# Patient Record
Sex: FEMALE | Race: WHITE | NOT HISPANIC OR LATINO | Employment: FULL TIME | URBAN - METROPOLITAN AREA
[De-identification: names, ages, dates, MRNs, and addresses within clinical notes are randomized per-mention and may not be internally consistent; named-entity substitution may affect disease eponyms.]

---

## 2019-01-31 ENCOUNTER — TRANSCRIBE ORDERS (OUTPATIENT)
Dept: PULMONOLOGY | Facility: HOSPITAL | Age: 39
End: 2019-01-31

## 2019-01-31 DIAGNOSIS — G47.33 OBSTRUCTIVE SLEEP APNEA (ADULT) (PEDIATRIC): Primary | ICD-10-CM

## 2019-02-27 ENCOUNTER — HOSPITAL ENCOUNTER (OUTPATIENT)
Dept: SLEEP MEDICINE | Facility: HOSPITAL | Age: 39
Discharge: HOME | End: 2019-02-27
Admitting: INTERNAL MEDICINE

## 2019-02-27 DIAGNOSIS — G47.33 OBSTRUCTIVE SLEEP APNEA (ADULT) (PEDIATRIC): ICD-10-CM

## 2019-02-27 PROCEDURE — 95800 SLP STDY UNATTENDED: CPT | Performed by: INTERNAL MEDICINE

## 2019-02-27 PROCEDURE — 95800 SLP STDY UNATTENDED: CPT

## 2019-02-27 RX ORDER — PROPRANOLOL HCL 60 MG
60 CAPSULE, EXTENDED RELEASE 24HR ORAL DAILY
COMMUNITY

## 2019-02-27 RX ORDER — HYDROXYCHLOROQUINE SULFATE 200 MG/1
TABLET, FILM COATED ORAL 2 TIMES DAILY
COMMUNITY

## 2019-02-27 RX ORDER — LISINOPRIL 20 MG/1
5 TABLET ORAL DAILY
COMMUNITY

## 2019-02-27 RX ORDER — MYCOPHENOLATE MOFETIL 500 MG/1
TABLET ORAL 2 TIMES DAILY
COMMUNITY

## 2019-02-27 RX ORDER — SUMATRIPTAN 100 MG/1
100 TABLET, FILM COATED ORAL
COMMUNITY

## 2019-02-27 RX ORDER — OMEPRAZOLE 40 MG/1
40 CAPSULE, DELAYED RELEASE ORAL DAILY
COMMUNITY

## 2019-02-27 RX ORDER — PROMETHAZINE HYDROCHLORIDE 12.5 MG/1
12.5 TABLET ORAL EVERY 6 HOURS PRN
COMMUNITY

## 2019-02-27 RX ORDER — DULOXETIN HYDROCHLORIDE 60 MG/1
60 CAPSULE, DELAYED RELEASE ORAL DAILY
COMMUNITY

## 2019-02-27 RX ORDER — PREDNISONE 20 MG/1
5 TABLET ORAL DAILY
COMMUNITY

## 2019-02-28 VITALS
BODY MASS INDEX: 39.99 KG/M2 | HEIGHT: 67 IN | OXYGEN SATURATION: 96 % | SYSTOLIC BLOOD PRESSURE: 159 MMHG | HEART RATE: 106 BPM | DIASTOLIC BLOOD PRESSURE: 88 MMHG | WEIGHT: 254.8 LBS

## 2019-03-11 ENCOUNTER — CONSULT (OUTPATIENT)
Dept: SLEEP MEDICINE | Facility: HOSPITAL | Age: 39
End: 2019-03-11

## 2019-03-11 VITALS
WEIGHT: 255.6 LBS | SYSTOLIC BLOOD PRESSURE: 146 MMHG | BODY MASS INDEX: 40.12 KG/M2 | DIASTOLIC BLOOD PRESSURE: 80 MMHG | HEIGHT: 67 IN | HEART RATE: 85 BPM | OXYGEN SATURATION: 98 %

## 2019-03-11 DIAGNOSIS — G47.61 PERIODIC LIMB MOVEMENT DISORDER: ICD-10-CM

## 2019-03-11 DIAGNOSIS — G47.33 OBSTRUCTIVE SLEEP APNEA, ADULT: Primary | ICD-10-CM

## 2019-03-11 PROCEDURE — 99243 OFF/OP CNSLTJ NEW/EST LOW 30: CPT | Performed by: NURSE PRACTITIONER

## 2019-03-11 NOTE — PATIENT INSTRUCTIONS
Restless Legs Syndrome  Restless legs syndrome is a condition that causes uncomfortable feelings or sensations in the legs, especially while sitting or lying down. The sensations usually cause an overwhelming urge to move the legs. The arms can also sometimes be affected.  The condition can range from mild to severe. The symptoms often interfere with a person's ability to sleep.  What are the causes?  The cause of this condition is not known.  What increases the risk?  This condition is more likely to develop in:  · People who are older than age 50.  · Pregnant women. In general, restless legs syndrome is more common in women than in men.  · People who have a family history of the condition.  · People who have certain medical conditions, such as iron deficiency, kidney disease, Parkinson disease, or nerve damage.  · People who take certain medicines, such as medicines for high blood pressure, nausea, colds, allergies, depression, and some heart conditions.    What are the signs or symptoms?  The main symptom of this condition is uncomfortable sensations in the legs. These sensations may be:  · Described as pulling, tingling, prickling, throbbing, crawling, or burning.  · Worse while you are sitting or lying down.  · Worse during periods of rest or inactivity.  · Worse at night, often interfering with your sleep.  · Accompanied by a very strong urge to move your legs.  · Temporarily relieved by movement of your legs.    The sensations usually affect both sides of the body. The arms can also be affected, but this is rare. People who have this condition often have tiredness during the day because of their lack of sleep at night.  How is this diagnosed?  This condition may be diagnosed based on your description of the symptoms. You may also have tests, including blood tests, to check for other conditions that may lead to your symptoms. In some cases, you may be asked to spend some time in a sleep lab so your sleeping  can be monitored.  How is this treated?  Treatment for this condition is focused on managing the symptoms. Treatment may include:  · Self-help and lifestyle changes.  · Medicines.    Follow these instructions at home:  · Take medicines only as directed by your health care provider.  · Try these methods to get temporary relief from the uncomfortable sensations:  ? Massage your legs.  ? Walk or stretch.  ? Take a cold or hot bath.  · Practice good sleep habits. For example, go to bed and get up at the same time every day.  · Exercise regularly.  · Practice ways of relaxing, such as yoga or meditation.  · Avoid caffeine and alcohol.  · Do not use any tobacco products, including cigarettes, chewing tobacco, or electronic cigarettes. If you need help quitting, ask your health care provider.  · Keep all follow-up visits as directed by your health care provider. This is important.  Contact a health care provider if:  Your symptoms do not improve with treatment, or they get worse.  This information is not intended to replace advice given to you by your health care provider. Make sure you discuss any questions you have with your health care provider.  Document Released: 12/08/2003 Document Revised: 05/25/2017 Document Reviewed: 12/14/2015  [employer] Interactive Patient Education © 2018 [employer]  Sleep Apnea  Sleep apnea is a condition that affects breathing. People with sleep apnea have moments during sleep when their breathing pauses briefly or gets shallow. Sleep apnea can cause these symptoms:  · Trouble staying asleep.  · Sleepiness or tiredness during the day.  · Irritability.  · Loud snoring.  · Morning headaches.  · Trouble concentrating.  · Forgetting things.  · Less interest in sex.  · Being sleepy for no reason.  · Mood swings.  · Personality changes.  · Depression.  · Waking up a lot during the night to pee (urinate).  · Dry mouth.  · Sore throat.    Follow these instructions at home:  · Make any changes in  your routine that your doctor recommends.  · Eat a healthy, well-balanced diet.  · Take over-the-counter and prescription medicines only as told by your doctor.  · Avoid using alcohol, calming medicines (sedatives), and narcotic medicines.  · Take steps to lose weight if you are overweight.  · If you were given a machine (device) to use while you sleep, use it only as told by your doctor.  · Do not use any tobacco products, such as cigarettes, chewing tobacco, and e-cigarettes. If you need help quitting, ask your doctor.  · Keep all follow-up visits as told by your doctor. This is important.  Contact a doctor if:  · The machine that you were given to use during sleep is uncomfortable or does not seem to be working.  · Your symptoms do not get better.  · Your symptoms get worse.  Get help right away if:  · Your chest hurts.  · You have trouble breathing in enough air (shortness of breath).  · You have an uncomfortable feeling in your back, arms, or stomach.  · You have trouble talking.  · One side of your body feels weak.  · A part of your face is hanging down (drooping).  These symptoms may be an emergency. Do not wait to see if the symptoms will go away. Get medical help right away. Call your local emergency services (911 in the [place] Do not drive yourself to the hospital.  This information is not intended to replace advice given to you by your health care provider. Make sure you discuss any questions you have with your health care provider.  Document Released: 09/26/2009 Document Revised: 08/13/2017 Document Reviewed: 09/26/2016  [employer] Interactive Patient Education © 2018 [employer]

## 2021-09-20 ENCOUNTER — CLINICAL SUPPORT NO REQUIREMENTS (OUTPATIENT)
Dept: PULMONOLOGY | Facility: CLINIC | Age: 41
End: 2021-09-20

## 2021-09-20 DIAGNOSIS — Z01.812 BLOOD TESTS PRIOR TO TREATMENT OR PROCEDURE: Primary | ICD-10-CM

## 2021-09-20 PROCEDURE — 99211 OFF/OP EST MAY X REQ PHY/QHP: CPT | Performed by: INTERNAL MEDICINE

## 2021-09-20 PROCEDURE — U0004 COV-19 TEST NON-CDC HGH THRU: HCPCS | Performed by: INTERNAL MEDICINE

## 2021-09-21 LAB — SARS-COV-2 RNA NOSE QL NAA+PROBE: NOT DETECTED

## 2021-09-22 ENCOUNTER — OFFICE VISIT (OUTPATIENT)
Dept: PULMONOLOGY | Facility: CLINIC | Age: 41
End: 2021-09-22

## 2021-09-22 VITALS
WEIGHT: 293 LBS | DIASTOLIC BLOOD PRESSURE: 90 MMHG | SYSTOLIC BLOOD PRESSURE: 130 MMHG | OXYGEN SATURATION: 98 % | HEART RATE: 76 BPM | HEIGHT: 67 IN | TEMPERATURE: 97.9 F | BODY MASS INDEX: 45.99 KG/M2

## 2021-09-22 DIAGNOSIS — R06.02 SHORTNESS OF BREATH: Primary | ICD-10-CM

## 2021-09-22 DIAGNOSIS — R06.00 DYSPNEA, UNSPECIFIED TYPE: ICD-10-CM

## 2021-09-22 PROBLEM — M32.9 SLE (SYSTEMIC LUPUS ERYTHEMATOSUS RELATED SYNDROME) (HCC): Status: ACTIVE | Noted: 2021-09-22

## 2021-09-22 LAB
ALBUMIN SERPL-MCNC: 4.7 G/DL (ref 3.5–5.2)
ALBUMIN/GLOB SERPL: 1.7 G/DL
ALP SERPL-CCNC: 108 U/L (ref 39–117)
ALT SERPL W P-5'-P-CCNC: 25 U/L (ref 1–33)
ANION GAP SERPL CALCULATED.3IONS-SCNC: 11.6 MMOL/L (ref 5–15)
AST SERPL-CCNC: 13 U/L (ref 1–32)
BASOPHILS # BLD AUTO: 0.08 10*3/MM3 (ref 0–0.2)
BASOPHILS NFR BLD AUTO: 0.8 % (ref 0–1.5)
BILIRUB SERPL-MCNC: 0.3 MG/DL (ref 0–1.2)
BUN SERPL-MCNC: 5 MG/DL (ref 6–20)
BUN/CREAT SERPL: 8.2 (ref 7–25)
CALCIUM SPEC-SCNC: 9.2 MG/DL (ref 8.6–10.5)
CHLORIDE SERPL-SCNC: 105 MMOL/L (ref 98–107)
CO2 SERPL-SCNC: 23.4 MMOL/L (ref 22–29)
CREAT SERPL-MCNC: 0.61 MG/DL (ref 0.57–1)
DEPRECATED RDW RBC AUTO: 41.7 FL (ref 37–54)
EOSINOPHIL # BLD AUTO: 0.13 10*3/MM3 (ref 0–0.4)
EOSINOPHIL NFR BLD AUTO: 1.3 % (ref 0.3–6.2)
ERYTHROCYTE [DISTWIDTH] IN BLOOD BY AUTOMATED COUNT: 13.3 % (ref 12.3–15.4)
GFR SERPL CREATININE-BSD FRML MDRD: 108 ML/MIN/1.73
GLOBULIN UR ELPH-MCNC: 2.7 GM/DL
GLUCOSE SERPL-MCNC: 103 MG/DL (ref 65–99)
HCT VFR BLD AUTO: 39.5 % (ref 34–46.6)
HGB BLD-MCNC: 12.4 G/DL (ref 12–15.9)
IMM GRANULOCYTES # BLD AUTO: 0.04 10*3/MM3 (ref 0–0.05)
IMM GRANULOCYTES NFR BLD AUTO: 0.4 % (ref 0–0.5)
LYMPHOCYTES # BLD AUTO: 2.13 10*3/MM3 (ref 0.7–3.1)
LYMPHOCYTES NFR BLD AUTO: 20.8 % (ref 19.6–45.3)
MCH RBC QN AUTO: 27.1 PG (ref 26.6–33)
MCHC RBC AUTO-ENTMCNC: 31.4 G/DL (ref 31.5–35.7)
MCV RBC AUTO: 86.4 FL (ref 79–97)
MONOCYTES # BLD AUTO: 0.62 10*3/MM3 (ref 0.1–0.9)
MONOCYTES NFR BLD AUTO: 6.1 % (ref 5–12)
NEUTROPHILS NFR BLD AUTO: 7.22 10*3/MM3 (ref 1.7–7)
NEUTROPHILS NFR BLD AUTO: 70.6 % (ref 42.7–76)
NRBC BLD AUTO-RTO: 0 /100 WBC (ref 0–0.2)
NT-PROBNP SERPL-MCNC: 37.6 PG/ML (ref 0–450)
PLATELET # BLD AUTO: 387 10*3/MM3 (ref 140–450)
PMV BLD AUTO: 10.7 FL (ref 6–12)
POTASSIUM SERPL-SCNC: 4 MMOL/L (ref 3.5–5.2)
PROT SERPL-MCNC: 7.4 G/DL (ref 6–8.5)
RBC # BLD AUTO: 4.57 10*6/MM3 (ref 3.77–5.28)
SODIUM SERPL-SCNC: 140 MMOL/L (ref 136–145)
TROPONIN T SERPL-MCNC: <0.01 NG/ML (ref 0–0.03)
TSH SERPL DL<=0.05 MIU/L-ACNC: 1.01 UIU/ML (ref 0.27–4.2)
WBC # BLD AUTO: 10.22 10*3/MM3 (ref 3.4–10.8)

## 2021-09-22 PROCEDURE — 86003 ALLG SPEC IGE CRUDE XTRC EA: CPT | Performed by: INTERNAL MEDICINE

## 2021-09-22 PROCEDURE — 80053 COMPREHEN METABOLIC PANEL: CPT | Performed by: INTERNAL MEDICINE

## 2021-09-22 PROCEDURE — 84443 ASSAY THYROID STIM HORMONE: CPT | Performed by: INTERNAL MEDICINE

## 2021-09-22 PROCEDURE — 99204 OFFICE O/P NEW MOD 45 MIN: CPT | Performed by: INTERNAL MEDICINE

## 2021-09-22 PROCEDURE — 94729 DIFFUSING CAPACITY: CPT | Performed by: INTERNAL MEDICINE

## 2021-09-22 PROCEDURE — 83880 ASSAY OF NATRIURETIC PEPTIDE: CPT | Performed by: INTERNAL MEDICINE

## 2021-09-22 PROCEDURE — 84484 ASSAY OF TROPONIN QUANT: CPT | Performed by: INTERNAL MEDICINE

## 2021-09-22 PROCEDURE — 94375 RESPIRATORY FLOW VOLUME LOOP: CPT | Performed by: INTERNAL MEDICINE

## 2021-09-22 PROCEDURE — 85025 COMPLETE CBC W/AUTO DIFF WBC: CPT | Performed by: INTERNAL MEDICINE

## 2021-09-22 PROCEDURE — 94726 PLETHYSMOGRAPHY LUNG VOLUMES: CPT | Performed by: INTERNAL MEDICINE

## 2021-09-22 RX ORDER — BUPROPION HYDROCHLORIDE 100 MG/1
100 TABLET ORAL 2 TIMES DAILY
COMMUNITY

## 2021-09-22 RX ORDER — ERGOCALCIFEROL 1.25 MG/1
CAPSULE ORAL
COMMUNITY

## 2021-09-22 RX ORDER — DEXTROAMPHETAMINE SACCHARATE, AMPHETAMINE ASPARTATE, DEXTROAMPHETAMINE SULFATE AND AMPHETAMINE SULFATE 1.25; 1.25; 1.25; 1.25 MG/1; MG/1; MG/1; MG/1
15 TABLET ORAL DAILY
COMMUNITY

## 2021-09-22 RX ORDER — PRAMIPEXOLE DIHYDROCHLORIDE 0.12 MG/1
TABLET ORAL
COMMUNITY
Start: 2021-07-28

## 2021-09-22 NOTE — PATIENT INSTRUCTIONS
1. MARIEL before and after exercise  2. ECHO  3. HRCT  4. Continue using CPAP (at least 6 h/night)  5. Blood tests: including pro BNP, allergy RAST Panel  6. RTC with results

## 2021-09-22 NOTE — PROGRESS NOTES
"[facility]  Initial Office Evaluation     REFERRING PHYSICIAN:  Kim Lema MD    Reason for Visit:     Niya is a 41 y.o. female here for evaluation of: Shortness of Breath        HPI     She described dyspnea for years but it has become more bothersome in the last 6 months.    It is worse with activities/exercise and supine position.  She notices chest tightness.  No cough, no sputum production.  Occasionally wheezes when she is doing activities/exercise.    She only has this \"flares\" once or twice a month.    She feels fine at present.    She is on Belibumab, Prednisone 5 mg/d and Mycophenolate for her SLE.  She was diagnosed with SLE ~ 2015, after having a positive ESPERANZA for 10 years..     PMH: She  has a past medical history of Asthma, H/O bladder infections, Headache, HTN (hypertension), Hypertension, Peptic ulceration, and Systemic lupus (CMS/HCC).    PSxH: She has a past surgical history that includes Tonsillectomy and Cholecystectomy open.      Immunization History   Administered Date(s) Administered   • COVID-19 (PFIZER) 01/20/2021, 02/10/2021, 09/08/2021   • Td, Not Adsorbed 01/01/2016   • Tdap 07/15/2008         Medications:     Current Outpatient Medications:   •  amphetamine-dextroamphetamine (ADDERALL) 5 MG tablet, Take 15 mg by mouth Daily., Disp: , Rfl:   •  Belimumab (BENLYSTA IV), Infuse  into a venous catheter., Disp: , Rfl:   •  buPROPion (WELLBUTRIN) 100 MG tablet, Take 100 mg by mouth 2 (Two) Times a Day., Disp: , Rfl:   •  DULoxetine (CYMBALTA) 60 MG capsule, Take 60 mg by mouth Daily., Disp: , Rfl:   •  ergocalciferol (ERGOCALCIFEROL) 1.25 MG (81384 UT) capsule, Vitamin D2 1,250 mcg (50,000 unit) capsule  Every week, Disp: , Rfl:   •  hydroxychloroquine (PLAQUENIL) 200 MG tablet, Take  by mouth 2 (Two) Times a Day., Disp: , Rfl:   •  levonorgestrel (KYLEENA) 19.5 MG intrauterine device IUD, 1 each by Intrauterine route 1 (One) Time., Disp: , Rfl:   •  lisinopril " (PRINIVIL,ZESTRIL) 20 MG tablet, Take 5 mg by mouth Daily., Disp: , Rfl:   •  mycophenolate (CELLCEPT) 500 MG tablet, Take  by mouth 2 (Two) Times a Day., Disp: , Rfl:   •  omeprazole (priLOSEC) 40 MG capsule, Take 40 mg by mouth Daily., Disp: , Rfl:   •  pramipexole (MIRAPEX) 0.125 MG tablet, TAKE 1 TABLET BY MOUTH DAILY AT BEDTIME X 1 WEEK, THEN INCREASE TO 2 TABS DAILY FOR RESTLESS LEGS, Disp: , Rfl:   •  predniSONE (DELTASONE) 20 MG tablet, Take 5 mg by mouth Daily., Disp: , Rfl:   •  promethazine (PHENERGAN) 12.5 MG tablet, Take 12.5 mg by mouth Every 6 (Six) Hours As Needed for Nausea or Vomiting., Disp: , Rfl:   •  propranolol LA (INDERAL LA) 60 MG 24 hr capsule, Take 60 mg by mouth Daily., Disp: , Rfl:   •  SUMAtriptan (IMITREX) 100 MG tablet, Take 100 mg by mouth Every 2 (Two) Hours As Needed for Migraine. Take one tablet at onset of headache. May repeat dose one time in 2 hours if headache not relieved., Disp: , Rfl:     Allergies: She is allergic to sulfa antibiotics.    FH: She family history includes Diabetes in her father; Heart disease in her father; Hypertension in her father and mother; Obesity in her father; Sleep apnea in her father.    SH: She  reports that she has never smoked. She has never used smokeless tobacco. She reports current alcohol use. She reports that she does not use drugs.         The following portions of the chart were reviewed this encounter and updated as appropriate.    History     Last Reviewed by Munod Chris MD on 9/22/2021 at 10:10 AM    Sections Reviewed    Medical      Problem list reviewed by Mundo Chris MD on 9/22/2021 at 10:09 AM  Medicines reviewed by Mundo Chris MD on 9/22/2021 at 10:09 AM  Allergies reviewed by Mundo Chris MD on 9/22/2021 at 10:09 AM      ROS:   Review of Systems   Constitutional: Positive for diaphoresis and fatigue.   Respiratory: Positive for chest tightness, shortness of breath and wheezing.    Endocrine: Positive for  "cold intolerance and heat intolerance.   Musculoskeletal: Positive for arthralgias, back pain, joint swelling and myalgias.   Skin: Positive for rash.   Allergic/Immunologic: Positive for immunocompromised state.   Neurological: Positive for headaches.   Psychiatric/Behavioral: Positive for decreased concentration.   All other systems reviewed and are negative.        O     Visit Vitals  /90   Pulse 76   Temp 97.9 °F (36.6 °C)   Ht 170.2 cm (67\")   Wt 136 kg (298 lb 12.8 oz)   LMP  (LMP Unknown)   SpO2 98% Comment: resting, room air   Breastfeeding No   BMI 46.80 kg/m²        Physical Examination    Constitutional:  No acute distress.   Neck: No JVD.   Cardiovascular: Normal rate, regular and rhythm.  No murmurs, gallop or rub.   Respiratory: No adventitious sounds.   Extremities: No Edema     Diagnostic Data    CXR 09/22/21  NAD. Increased interstitial markings.     PFT 9/22/2021:  Normal Spirometry.  The maximum voluntary ventilation (MVV) is reduced.  Lung volumes are normal.  The diffusing capacity is normal.      SpO2 Readings from Last 3 Encounters:   09/22/21 98%   03/11/19 98%   02/27/19 96%      Assessment/Plan   A / P     Assessment:    1. Dyspnea: Differential diagnosis includes  Pulmonary Diseases: PFT no evidence of restrictive or obstructive disease.  Obesity III. Body mass index is 46.8 kg/m².   R/O GERD, she is already on PPI  R/O Others  2. SLE  3. Raynauds  4. HTN  5. Peptic ulcer disease, on PPI.  6. OSMANY, diagnosed 2019. On Auto-CPAP. Home sleep test: AHI 61.5    Plan:  Orders Placed This Encounter   Procedures   • XR Chest PA & Lateral   • CT Chest Hi Resolution   • Allergens, Zone 8   • BNP   • TSH   • Comprehensive Metabolic Panel   • Troponin   • CBC Auto Differential   • IgE Level   • Adult Transthoracic Echo Complete W/ Cont if Necessary Per Protocol   • Pulmonary Function Test   • Stress Test, Pulmonary   • CBC & Differential     Patient Instructions   1. MARIEL before and after " exercise  2. ECHO  3. HRCT  4. Continue using CPAP (at least 6 h/night)  5. Blood tests: including pro BNP, allergy RAST Panel  6. RTC with results    Return in about 2 weeks (around 10/6/2021) for Recheck with Results.    I discussed the diagnosis, evaluation, and  treatment options with the patient and/or appropriate family members.    Thank you very much for allowing me to participate in the care of your patient.    I spent 46 minutes on this date of service. This time includes time spent by me in the following activities:preparing for the visit, reviewing tests, obtaining and/or reviewing a separately obtained history, performing a medically appropriate examination, counseling the patient/family/caregiver, ordering medications, tests, or procedures, and/or documenting information in the medical record.    CRISTINC.: Kim Lema MD

## 2021-09-26 LAB
A ALTERNATA IGE QN: <0.1 KU/L
A FUMIGATUS IGE QN: <0.1 KU/L
AMER ROACH IGE QN: <0.1 KU/L
BAHIA GRASS IGE QN: <0.1 KU/L
BERMUDA GRASS IGE QN: <0.1 KU/L
BOXELDER IGE QN: <0.1 KU/L
C HERBARUM IGE QN: <0.1 KU/L
CAT DANDER IGE QN: <0.1 KU/L
CMN PIGWEED IGE QN: <0.1 KU/L
COMMON RAGWEED IGE QN: <0.1 KU/L
CONV CLASS DESCRIPTION: NORMAL
D FARINAE IGE QN: <0.1 KU/L
D PTERONYSS IGE QN: <0.1 KU/L
DOG DANDER IGE QN: <0.1 KU/L
ENGL PLANTAIN IGE QN: <0.1 KU/L
HAZELNUT POLN IGE QN: <0.1 KU/L
JOHNSON GRASS IGE QN: <0.1 KU/L
KENT BLUE GRASS IGE QN: <0.1 KU/L
LONDON PLANE IGE QN: <0.1 KU/L
M RACEMOSUS IGE QN: <0.1 KU/L
MT JUNIPER IGE QN: <0.1 KU/L
MUGWORT IGE QN: <0.1 KU/L
NETTLE IGE QN: <0.1 KU/L
P NOTATUM IGE QN: <0.1 KU/L
S BOTRYOSUM IGE QN: <0.1 KU/L
SHEEP SORREL IGE QN: <0.1 KU/L
SWEET GUM IGE QN: <0.1 KU/L
WHITE ELM IGE QN: <0.1 KU/L
WHITE HICKORY IGE QN: <0.1 KU/L
WHITE MULBERRY IGE QN: <0.1 KU/L
WHITE OAK IGE QN: <0.1 KU/L

## 2021-10-07 ENCOUNTER — TELEPHONE (OUTPATIENT)
Dept: PULMONOLOGY | Facility: CLINIC | Age: 41
End: 2021-10-07

## 2021-10-07 NOTE — TELEPHONE ENCOUNTER
Due to unable to reach patient to schedule Chest CT HI Resolution and ECHO Complete, a certified letter was sent on 10/08/2021

## 2023-01-26 NOTE — PROGRESS NOTES
Subjective:     Chief Complaint:   Chief Complaint   Patient presents with   • Follow-up   Follow-up        HPI:    Niya Padilla is a 39 y.o. female here in consult at the request of Dr. Alexy Michael.  Patient's primary care provider is .  Patient has a history of lupus, GERD and hypertension.  Patient has a 10-year history of daytime sleepiness and fatigue.  She does need naps daily.  Patient does not feel rested upon awakening.  Problem has also included memory and attention issues.  Patient was diagnosed 4 years ago with lupus and at that time she has noticed that fatigue, sleepiness, and memory changes have worsened.  She has also had problems sleeping due to restless leg.  She was placed on Requip per Neurologist.  At this time she has not started this medication.Patient has a history of excessive daytime sleepiness.  She tends to fall asleep during the times of the day when she is quiet.  She has fallen asleep at stop lights in the past.  She denies any near accidents while driving due to sleepiness.  She does take scheduled naps during the day but continues to suffer fatigue.  She is sleepy even on vacation and also an increasing sleep time.       Patient does snore while sleeping on her back.  She does awaken with a dry mouth.  Her  has witnessed apneas although patient has not awaken gasping or choking in the night.  She does have morning headaches approximately 1-2 times a week.  Patient denies ever breaking her nose or any nasal surgeries.    Patient goes to bed during the week anywhere from 11 to 11:30 PM and awakens at 645.  On the weekends she will go to bed at 1 AM and awakening at 11 AM.  She approximates getting 8 hours nightly.  She does still feel excessively sleepy upon awakening.  Takes her approximately 10-15 minutes to fall asleep.  She will awaken 2 times during the night and intermittently has trouble going back to sleep.    Patient does not have reflux and takes  medication with relief for this.  Patient denies sudden episodes of sleep during the day.  Patient denies sleep paralysis, hypnagogic hallucinations, or cataplexy.  She has been diagnosed with restless leg and was placed on Requip.  At this time patient has not started this medication.    Patient does talk in her sleep and act out her dreams.  She does awaken screaming at night intermittently.  She does have excessive sweating during sleep.  She grinds her teeth.  Patient denies any severe head trauma.  Patient does not suffer from seizures.  Patient has an Eldred score of 18/24.    Social history  Patient is a 39-year-old   female.  She is a teacher.  Patient drinks 2-3 alcoholic beverages a week.  She will sometimes have wine or beer on the weekends.  She drinks 4 cups of regular coffee daily.  Patient will drink 1-2 abhishek daily.      Family History   Problem Relation Age of Onset   • Hypertension Mother    • Diabetes Father    • Heart disease Father    • Hypertension Father    • Obesity Father    • Sleep apnea Father      Past Medical History:   Diagnosis Date   • Hypertension    • Peptic ulceration    • Systemic lupus (CMS/HCC)      Past Surgical History:   Procedure Laterality Date   • CHOLECYSTECTOMY OPEN     • TONSILLECTOMY         Current medications are:   Current Outpatient Medications:   •  Belimumab (BENLYSTA IV), Infuse  into a venous catheter., Disp: , Rfl:   •  DULoxetine (CYMBALTA) 60 MG capsule, Take 60 mg by mouth Daily., Disp: , Rfl:   •  hydroxychloroquine (PLAQUENIL) 200 MG tablet, Take  by mouth 2 (Two) Times a Day., Disp: , Rfl:   •  levonorgestrel (KYLEENA) 19.5 MG intrauterine device IUD, 1 each by Intrauterine route 1 (One) Time., Disp: , Rfl:   •  lisinopril (PRINIVIL,ZESTRIL) 20 MG tablet, Take 20 mg by mouth Daily., Disp: , Rfl:   •  mycophenolate (CELLCEPT) 500 MG tablet, Take  by mouth 2 (Two) Times a Day., Disp: , Rfl:   •  omeprazole (priLOSEC) 40 MG capsule, Take 40  mg by mouth Daily., Disp: , Rfl:   •  predniSONE (DELTASONE) 20 MG tablet, Take 20 mg by mouth As Needed., Disp: , Rfl:   •  promethazine (PHENERGAN) 12.5 MG tablet, Take 12.5 mg by mouth Every 6 (Six) Hours As Needed for Nausea or Vomiting., Disp: , Rfl:   •  propranolol LA (INDERAL LA) 60 MG 24 hr capsule, Take 60 mg by mouth Daily., Disp: , Rfl:   •  SUMAtriptan (IMITREX) 100 MG tablet, Take 100 mg by mouth Every 2 (Two) Hours As Needed for Migraine. Take one tablet at onset of headache. May repeat dose one time in 2 hours if headache not relieved., Disp: , Rfl: .      The patient's relevant past medical, surgical, family and social history were reviewed and updated in Epic as appropriate.       Review of Systems   Constitutional: Positive for fatigue.   HENT: Positive for mouth sores and trouble swallowing.    Respiratory: Positive for apnea and shortness of breath.    Gastrointestinal: Positive for abdominal pain, nausea and vomiting.   Endocrine: Positive for cold intolerance and heat intolerance.   Genitourinary: Positive for dysuria.   Musculoskeletal: Positive for arthralgias, back pain and joint swelling.   Skin: Positive for rash.   Neurological: Positive for weakness.   Psychiatric/Behavioral: Positive for confusion, decreased concentration and sleep disturbance.   All other systems reviewed and are negative.        Objective:    Physical Exam   Constitutional: She is oriented to person, place, and time. She appears well-developed and well-nourished.   HENT:   Head: Normocephalic and atraumatic.   Mouth/Throat: Oropharynx is clear and moist.   Mallampati 3 anatomy   Eyes: Conjunctivae are normal.   Neck: Neck supple. No thyromegaly present.   Cardiovascular: Normal rate and regular rhythm.   Pulmonary/Chest: Effort normal and breath sounds normal.   Lymphadenopathy:     She has no cervical adenopathy.   Neurological: She is alert and oriented to person, place, and time.   Skin: Skin is warm and dry.  "  Psychiatric: She has a normal mood and affect. Her behavior is normal. Judgment and thought content normal.   Nursing note and vitals reviewed.    /80   Pulse 85   Ht 170.2 cm (67\")   Wt 116 kg (255 lb 9.6 oz)   SpO2 98%   BMI 40.03 kg/m²       ASSESSMENT/PLAN    Niya was seen today for follow-up.    Diagnoses and all orders for this visit:    Obstructive sleep apnea, adult  -     CPAP Therapy    Periodic limb movement disorder            1. Counseled patient regarding multimodal approach with healthy nutrition, healthy sleep, regular physical activity, social activities, counseling, and medications. Encouraged to practice lateral sleep position. Avoid alcohol and sedatives close to bedtime.  2. Patient presents with a history as noted above.  Could be related to sleep disordered breathing.  It could be related to periodic limb movement disorder.  Patient is already had a polysomnogram that did show severe obstructive sleep apnea.  We have discussed possible therapies for sleep disordered breathing including CPAP, weight control, oral appliances, and surgery.  We have also discussed the long-term consequences of untreated obstructive sleep apnea.  We have discussed possible medical therapies for periodic limb movement disorder and I have encouraged patient to start Requip.  Order faxed to DME of patient's choice I will see her back in 31-90 days to reassess.    I have reviewed the results of my evaluation and impression and discussed my recommendations in detail with the patient.      Signed by  QUOC Walters    March 11, 2019      CC: Kim Lema MD          No ref. provider found      " No

## 2023-08-25 RX ORDER — FAMOTIDINE 20 MG/1
40 TABLET, FILM COATED ORAL ONCE AS NEEDED
Start: 2023-09-18

## 2023-08-25 RX ORDER — CETIRIZINE HYDROCHLORIDE 10 MG/1
10 TABLET ORAL ONCE
OUTPATIENT
Start: 2023-09-18

## 2023-08-25 RX ORDER — ACETAMINOPHEN 325 MG/1
650 TABLET ORAL ONCE
OUTPATIENT
Start: 2023-09-18